# Patient Record
(demographics unavailable — no encounter records)

---

## 2025-05-28 NOTE — HISTORY OF PRESENT ILLNESS
[Yes] : Patient goes to dentist yearly [Up to date] : Up to date [de-identified] : None [FreeTextEntry8] : Doesn't get period w/ Mirena. No spotting. Hasn't had period in a year.  [FreeTextEntry1] : Nancy is a 21 yo F who presents for annual exam. She has history of ADD for which she takes Adderall stimulant on as needed basis 5mg. Tries not to be dependent on medication. Just for exams, or heavy study periods. Rx by Psychiatrist Dr. Greer.  Gyn hx: Follows with gyn. Has Mirena.  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   Diet: - eats breakfast a yogurt - lunch dining marley salad or sandwich.  joes nuts in bag.  - dinner pasta or chicken. Dessert sometimes   BMs regularly Exercise: Goes to gym at school and walks a lot.   Sleep: 7-8 hours of sleep. 12:30/1- wakes up at 9   H: lives with roommate living together next year on campus  E: [employer]; Sophomore Year just finished. Psych and Business Major A: work at a  nearby in a few clubs, in [employer]. [employer] fr[employer]. Taking 15 credits next semester.  D: no smoking cigarettes, no vaping. Sporadic alcohol use. no marijuana use. no other drugs.  S: in past sexually active, men; uses protection 100% of time. Agrees to STI testing today.  S: happy mood.

## 2025-05-28 NOTE — HISTORY OF PRESENT ILLNESS
[Yes] : Patient goes to dentist yearly [Up to date] : Up to date [de-identified] : None [FreeTextEntry8] : Doesn't get period w/ Mirena. No spotting. Hasn't had period in a year.  [FreeTextEntry1] : Nancy is a 21 yo F who presents for annual exam. She has history of ADD for which she takes Adderall stimulant on as needed basis 5mg. Tries not to be dependent on medication. Just for exams, or heavy study periods. Rx by Psychiatrist Dr. Greer.  Gyn hx: Follows with gyn. Has Mirena.  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   Diet: - eats breakfast a yogurt - lunch dining marley salad or sandwich.  joes nuts in bag.  - dinner pasta or chicken. Dessert sometimes   BMs regularly Exercise: Goes to gym at school and walks a lot.   Sleep: 7-8 hours of sleep. 12:30/1- wakes up at 9   H: lives with roommate living together next year on campus  E: [employer]; Sophomore Year just finished. 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  D: no smoking cigarettes, no vaping. Sporadic alcohol use. no marijuana use. no other drugs.  S: in past sexually active, men; uses protection 100% of time. Agrees to STI testing today.  S: happy mood.

## 2025-05-28 NOTE — DISCUSSION/SUMMARY
[FreeTextEntry1] : LINN is a 20 year yo F who presents for wce. Growth and development normal for age. Vitals reviewed - normal for age. Hearing screens passed. Wears glasses.   #annual - Sti screening for syphilis, hiv, hepatitis c - routine labs - advised tdap booster at today's visit, declined by patient because she is traveling. Advised if she gets scrape with metal, will need to go to her student [facility] center within 1 day for evaluation - Continue balanced diet with all food groups. Brush teeth twice a day with toothbrush. Recommend visit to dentist. Maintain consistent daily routines and sleep schedule. Personal hygiene, puberty, and sexual health reviewed. Risky behaviors assessed. School discussed. Limit screen time to no more than 2 hours per day. Encourage physical activity. Return 1 year   #vision - wears contacts; advise annual optometrist f/u  Follows with gyn annually.  Advised to start following with derm for annual skin check.

## 2025-05-28 NOTE — DISCUSSION/SUMMARY
[FreeTextEntry1] : LINN is a 20 year yo F who presents for wce. Growth and development normal for age. Vitals reviewed - normal for age. Hearing screens passed. Wears glasses.   #annual - Sti screening for syphilis, hiv, hepatitis c - routine labs - advised tdap booster at today's visit, declined by patient because she is traveling. Advised if she gets scrape with metal, will need to go to her student [facility] center within 1 day for evaluation - Continue balanced diet with all food groups. Brush teeth twice a day with toothbrush. Recommend visit to dentist. Maintain consistent daily routines and sleep schedule. Personal hygiene, puberty, and sexual health reviewed. Risky behaviors assessed. School discussed. Limit screen time to no more than 2 hours per day. Encourage physical activity. Return 1 year   #vision - wears contacts; advise annual optometrist f/u  Follows with gyn annually.  Advised to start following with derm for annual skin check.

## 2025-05-28 NOTE — RISK ASSESSMENT
[0] : 2) Feeling down, depressed, or hopeless: Not at all (0) [PHQ-2 Negative - No further assessment needed] : PHQ-2 Negative - No further assessment needed [No Increased risk of SCA or SCD] : No Increased risk of SCA or SCD    [Yes] : Patient consents to screening. [Have you ever fainted, passed out or had an unexplained seizure suddenly and without warning, especially during exercise or in response] : Have you ever fainted, passed out or had an unexplained seizure suddenly and without warning, especially during exercise or in response to sudden loud noises such as doorbells, alarm clocks and ringing telephones? No [Have you ever had exercise-related chest pain or shortness of breath?] : Have you ever had exercise-related chest pain or shortness of breath? No [Has anyone in your immediate family (parents, grandparents, siblings) or other more distant relatives (aunts, uncles, cousins)  of heart] : Has anyone in your immediate family (parents, grandparents, siblings) or other more distant relatives (aunts, uncles, cousins)  of heart problems or had an unexpected sudden death before age 50 (This would include unexpected drownings, unexplained car accidents in which the relative was driving or sudden infant death syndrome.)? No [Are you related to anyone with hypertrophic cardiomyopathy or hypertrophic obstructive cardiomyopathy, Marfan syndrome, arrhythmogenic] : Are you related to anyone with hypertrophic cardiomyopathy or hypertrophic obstructive cardiomyopathy, Marfan syndrome, arrhythmogenic right ventricular cardiomyopathy, long QT syndrome, short QT syndrome, Brugada syndrome or catecholaminergic polymorphic ventricular tachycardia, or anyone younger than 50 years with a pacemaker or implantable defibrillator? No